# Patient Record
Sex: FEMALE | Race: OTHER | Employment: FULL TIME | URBAN - METROPOLITAN AREA
[De-identification: names, ages, dates, MRNs, and addresses within clinical notes are randomized per-mention and may not be internally consistent; named-entity substitution may affect disease eponyms.]

---

## 2022-10-24 ENCOUNTER — HOSPITAL ENCOUNTER (OUTPATIENT)
Age: 22
Discharge: HOME | End: 2022-10-24
Payer: COMMERCIAL

## 2022-10-24 VITALS
HEART RATE: 93 BPM | DIASTOLIC BLOOD PRESSURE: 87 MMHG | OXYGEN SATURATION: 99 % | TEMPERATURE: 98 F | RESPIRATION RATE: 20 BRPM | SYSTOLIC BLOOD PRESSURE: 135 MMHG

## 2022-10-24 DIAGNOSIS — R05.1 ACUTE COUGH: Primary | ICD-10-CM

## 2022-10-24 DIAGNOSIS — Z86.16 HISTORY OF COVID-19: ICD-10-CM

## 2022-10-24 PROCEDURE — 99202 OFFICE O/P NEW SF 15 MIN: CPT | Performed by: NURSE PRACTITIONER

## 2022-10-24 NOTE — ED INITIAL ASSESSMENT (HPI)
Pt states she has covid 10/15/22, and states she is still waking up with high fevers in the morning but then is fine after 12pm, pt states she needs a note to return back to work

## (undated) NOTE — LETTER
Date & Time: 10/24/2022, 4:12 PM  Patient: Peyton Lewis  Encounter Provider(s):    [email or web address]       To Whom It May Concern:    Mar Ly was seen and treated in our department on 10/24/2022. She can return to work 10/25/2022.     If you have any questions or concerns, please do not hesitate to call.        _____________________________  Physician/[facility] Signature